# Patient Record
Sex: FEMALE | Race: BLACK OR AFRICAN AMERICAN | Employment: FULL TIME | URBAN - METROPOLITAN AREA
[De-identification: names, ages, dates, MRNs, and addresses within clinical notes are randomized per-mention and may not be internally consistent; named-entity substitution may affect disease eponyms.]

---

## 2017-09-01 ENCOUNTER — OFFICE VISIT (OUTPATIENT)
Dept: OBGYN CLINIC | Age: 37
End: 2017-09-01

## 2017-09-01 VITALS
WEIGHT: 166 LBS | BODY MASS INDEX: 27.66 KG/M2 | HEART RATE: 96 BPM | DIASTOLIC BLOOD PRESSURE: 98 MMHG | HEIGHT: 65 IN | SYSTOLIC BLOOD PRESSURE: 148 MMHG

## 2017-09-01 DIAGNOSIS — R03.0 ELEVATED BLOOD PRESSURE READING: ICD-10-CM

## 2017-09-01 DIAGNOSIS — Z01.419 WELL WOMAN EXAM WITH ROUTINE GYNECOLOGICAL EXAM: Primary | ICD-10-CM

## 2017-09-01 NOTE — PROGRESS NOTES
Subjective:   39 y.o. female for Well Woman Check. Patient's last menstrual period was 08/15/2017. Social History: single partner, contraception - none. Pertinent past medical hstory: see medical hx below. Patient Active Problem List   Diagnosis Code    Cardiomyopathy ([facility] [address] I42.9    Status post cardiac arrest I46.9    Hypokalemia E87.6    Hypomagnesemia E83.42    Automatic implantable cardiac defibrillator in situ Z95.810    AICD (automatic cardioverter/defibrillator) present Z95.810    AICD at end of battery life Z45.02     Current Outpatient Prescriptions   Medication Sig Dispense Refill    potassium chloride (K-DUR, KLOR-CON) 10 mEq tablet Take 1 Tab by mouth daily. 30 Tab 6    norgestimate-ethinyl estradiol (ORTHO TRI-CYCLEN, TRI-SPRINTEC) 0.18/0.215/0.25 mg-35 mcg (28) tab Take 1 Tab by mouth daily. 3 Package 4    magnesium 250 mg Tab Take 1 Tab by mouth daily. 30 Tab 6     No Known Allergies  Past Medical History:   Diagnosis Date    Cardiac arrest ([facility] [address] 01/01/2006 and 2009    with subsequent Medtronic single-chamber AICD    Cardiac cath 01/01/2006    Cardiac echocardiogram 09/08/2009    EF 60%. No valvular pathology.  Cardiomyopathy     Transient, with EF of 60% since September 8, 2009.  Hypokalemia     Hypomagnesemia     Torsades de pointes ([facility] [address]     recurrent     Past Surgical History:   Procedure Laterality Date    HX HEART CATHETERIZATION  01/01/2006    HX HERNIA REPAIR      HX PACEMAKER  09/2009    AICD placement     History reviewed. No pertinent family history. Social History   Substance Use Topics    Smoking status: Never Smoker    Smokeless tobacco: Never Used    Alcohol use Yes        ROS:  Feeling well. No dyspnea or chest pain on exertion. No abdominal pain, change in bowel habits, black or bloody stools. No urinary tract symptoms.  GYN ROS: normal menses, no abnormal bleeding, pelvic pain or discharge, no breast pain or new or enlarging lumps on self exam. No neurological complaints. Objective:     Visit Vitals    BP (!) 153/111 (BP 1 Location: Right arm, BP Patient Position: Sitting)    Pulse 96    Ht 5' 5\" (1.651 m)    Wt 166 lb (75.3 kg)    LMP 08/15/2017    BMI 27.62 kg/m2     The patient appears well, alert, oriented x 3, in no distress. ENT normal.  Neck supple. No adenopathy or thyromegaly. MARY. Lungs are clear, good air entry, no wheezes, rhonchi or rales. S1 and S2 normal, no murmurs, regular rate and rhythm. Abdomen soft without tenderness, guarding, mass or organomegaly. Extremities show no edema, normal peripheral pulses. Neurological is normal, no focal findings. BREAST EXAM: breasts appear normal, no suspicious masses, no skin or nipple changes or axillary nodes    PELVIC EXAM: VULVA: normal appearing vulva with no masses, tenderness or lesions, VAGINA: normal appearing vagina with normal color and discharge, no lesions, CERVIX: normal appearing cervix without discharge or lesions, cervical motion tenderness absent, UTERUS: uterus is normal size, shape, consistency and nontender, ADNEXA: normal adnexa in size, nontender and no masses    Assessment/Plan:   well woman  pap smear due 2018  counseled on breast self exam, STD prevention and HIV risk factors and prevention  return annually or prn    ICD-10-CM ICD-9-CM    1. Well woman exam with routine gynecological exam Z01.419 V72.31    2. Elevated blood pressure reading R03.0 796.2 REFERRAL TO PRIMARY CARE   .

## 2017-09-01 NOTE — MR AVS SNAPSHOT
Visit Information Date & Time Provider Department Dept. Phone Encounter #  
 9/1/2017  3:30 PM Rosalia Ocasio [place] [phone] [id number] Follow-up Instructions Return in about 1 year (around 9/1/2018). Upcoming Health Maintenance Date Due INFLUENZA AGE 9 TO ADULT 8/1/2017 PAP AKA CERVICAL CYTOLOGY 9/22/2019 Allergies as of 9/1/2017  Review Complete On: 9/1/2017 By: Jacquelyn Parr CNM No Known Allergies Current Immunizations  Never Reviewed No immunizations on file. Not reviewed this visit You Were Diagnosed With   
  
 Codes Comments Well woman exam with routine gynecological exam    -  Primary ICD-10-CM: R77.787 ICD-9-CM: V72.31 Elevated blood pressure reading     ICD-10-CM: R03.0 ICD-9-CM: 796.2 Vitals BP Pulse Height(growth percentile) Weight(growth percentile) LMP BMI  
 (!) 153/111 (BP 1 Location: Right arm, BP Patient Position: Sitting) 96 5' 5\" (1.651 m) 166 lb (75.3 kg) 08/15/2017 27.62 kg/m2 OB Status Smoking Status Having regular periods Never Smoker BMI and BSA Data Body Mass Index Body Surface Area  
 27.62 kg/m 2 1.86 m 2 Preferred Pharmacy Pharmacy Name Phone [facility] [phone] [place] [address] [phone] Your Updated Medication List  
  
   
This list is accurate as of: 9/1/17  3:58 PM.  Always use your most recent med list.  
  
  
  
  
 magnesium 250 mg Tab Take 1 Tab by mouth daily. norgestimate-ethinyl estradiol 0.18/0.215/0.25 mg-35 mcg (28) Tab Commonly known as:  ORTHO TRI-CYCLEN, TRI-SPRINTEC Take 1 Tab by mouth daily. potassium chloride 10 mEq tablet Commonly known as:  KLOR-CON Take 1 Tab by mouth daily. We Performed the Following REFERRAL TO PRIMARY CARE [NOG839 CPT(R)] Comments: Please evaluate patient for elevated blood pressure. Follow-up Instructions Return in about 1 year (around 9/1/2018). Referral Information Referral ID Referred By Referred To  
  
 [id number] Eugenia Islas NP   
   [address] [place] [address] [facility] Phone: [phone] Fax: [phone] Visits Status Start Date End Date 1 New Request 9/1/17 9/1/18 If your referral has a status of pending review or denied, additional information will be sent to support the outcome of this decision. Patient Instructions Well Visit, Ages 25 to 48: Care Instructions Your Care Instructions Physical exams can help you stay healthy. Your doctor has checked your overall health and may have suggested ways to take good care of yourself. He or she also may have recommended tests. At home, you can help prevent illness with healthy eating, regular exercise, and other steps. Follow-up care is a key part of your treatment and safety. Be sure to make and go to all appointments, and call your doctor if you are having problems. It's also a good idea to know your test results and keep a list of the medicines you take. How can you care for yourself at home? · Reach and stay at a healthy weight. This will lower your risk for many problems, such as obesity, diabetes, heart disease, and high blood pressure. · Get at least 30 minutes of physical activity on most days of the week. Walking is a good choice. You also may want to do other activities, such as running, swimming, cycling, or playing tennis or team sports. Discuss any changes in your exercise program with your doctor. · Do not smoke or allow others to smoke around you. If you need help quitting, talk to your doctor about stop-smoking programs and medicines. These can increase your chances of quitting for good.  
· Talk to your doctor about whether you have any risk factors for sexually transmitted infections (STIs). Having one sex partner (who does not have STIs and does not have sex with anyone else) is a good way to avoid these infections. · Use birth control if you do not want to have children at this time. Talk with your doctor about the choices available and what might be best for you. · Protect your skin from too much sun. When you're outdoors from 10 a.m. to 4 p.m., stay in the shade or cover up with clothing and a hat with a wide brim. Wear sunglasses that block UV rays. Even when it's cloudy, put broad-spectrum sunscreen (SPF 30 or higher) on any exposed skin. · See a dentist one or two times a year for checkups and to have your teeth cleaned. · Wear a seat belt in the car. · Drink alcohol in moderation, if at all. That means no more than 2 drinks a day for men and 1 drink a day for women. Follow your doctor's advice about when to have certain tests. These tests can spot problems early. For everyone · Cholesterol. Have the fat (cholesterol) in your blood tested after age 21. Your doctor will tell you how often to have this done based on your age, family history, or other things that can increase your risk for heart disease. · Blood pressure. Have your blood pressure checked during a routine doctor visit. Your doctor will tell you how often to check your blood pressure based on your age, your blood pressure results, and other factors. · Vision. Talk with your doctor about how often to have a glaucoma test. 
· Diabetes. Ask your doctor whether you should have tests for diabetes. · Colon cancer. Have a test for colon cancer at age 48. You may have one of several tests. If you are younger than 48, you may need a test earlier if you have any risk factors. Risk factors include whether you already had a precancerous polyp removed from your colon or whether your parent, brother, sister, or child has had colon cancer. For women · Breast exam and mammogram. Talk to your doctor about when you should have a clinical breast exam and a mammogram. Medical experts differ on whether and how often women under 50 should have these tests. Your doctor can help you decide what is right for you. · Pap test and pelvic exam. Begin Pap tests at age 24. A Pap test is the best way to find cervical cancer. The test often is part of a pelvic exam. Ask how often to have this test. 
· Tests for sexually transmitted infections (STIs). Ask whether you should have tests for STIs. You may be at risk if you have sex with more than one person, especially if your partners do not wear condoms. For men · Tests for sexually transmitted infections (STIs). Ask whether you should have tests for STIs. You may be at risk if you have sex with more than one person, especially if you do not wear a condom. · Testicular cancer exam. Ask your doctor whether you should check your testicles regularly. · Prostate exam. Talk to your doctor about whether you should have a blood test (called a PSA test) for prostate cancer. Experts differ on whether and when men should have this test. Some experts suggest it if you are older than 39 and are -American or have a father or brother who got prostate cancer when he was younger than 72. When should you call for help? Watch closely for changes in your health, and be sure to contact your doctor if you have any problems or symptoms that concern you. Where can you learn more? Go to http://papito-sierra.info/. Enter P072 in the search box to learn more about \"Well Visit, Ages 25 to 48: Care Instructions. \" Current as of: July 19, 2016 Content Version: 11.3 © 0781-5898 Healthwise, Incorporated. Care instructions adapted under license by [employer] (which disclaims liability or warranty for this information).  If you have questions about a medical condition or this instruction, always ask your healthcare professional. [address] any warranty or liability for your use of this information. Introducing [facility] & HEALTH SERVICES! Dear Candelaria Infante: Thank you for requesting a [employer]Realm account. Our records indicate that you already have an active GymRealm account. You can access your account anytime at [email or web address] Did you know that you can access your hospital and ER discharge instructions at any time in GymRealm? You can also review all of your test results from your hospital stay or ER visit. Additional Information If you have questions, please visit the Frequently Asked Questions section of the GymRealm website at [email or web address] Remember, GymRealm is NOT to be used for urgent needs. For medical emergencies, dial 911. Now available from your iPhone and Android! Please provide this summary of care documentation to your next provider. Your primary care clinician is listed as Stephanie Vinson. If you have any questions after today's visit, please call [phone].

## 2017-10-19 ENCOUNTER — TELEPHONE (OUTPATIENT)
Dept: OBGYN CLINIC | Age: 37
End: 2017-10-19

## 2017-10-19 NOTE — TELEPHONE ENCOUNTER
Patient was not prescribed birth control at last office visit due to extremely high blood pressure values. She will need to schedule an office visit to have blood pressure re-checked and review cardiac hx for birth control safety.

## 2017-10-20 NOTE — TELEPHONE ENCOUNTER
Left message for pt that she was not prescribed BC due to her blood pressure being elevated. Pt needs to schedule an OV to recheck her BP and review her cardiac history before [facility] and be prescribed safely.

## 2018-02-08 ENCOUNTER — CLINICAL SUPPORT (OUTPATIENT)
Dept: CARDIOLOGY CLINIC | Age: 38
End: 2018-02-08

## 2018-02-08 ENCOUNTER — OFFICE VISIT (OUTPATIENT)
Dept: CARDIOLOGY CLINIC | Age: 38
End: 2018-02-08

## 2018-02-08 VITALS
WEIGHT: 172 LBS | BODY MASS INDEX: 28.66 KG/M2 | HEART RATE: 71 BPM | DIASTOLIC BLOOD PRESSURE: 60 MMHG | SYSTOLIC BLOOD PRESSURE: 140 MMHG | OXYGEN SATURATION: 99 % | HEIGHT: 65 IN

## 2018-02-08 DIAGNOSIS — Z45.02 AICD AT END OF BATTERY LIFE: ICD-10-CM

## 2018-02-08 DIAGNOSIS — I42.9 CARDIOMYOPATHY, UNSPECIFIED TYPE (HCC): ICD-10-CM

## 2018-02-08 DIAGNOSIS — I42.9 CARDIOMYOPATHY, UNSPECIFIED TYPE (HCC): Primary | ICD-10-CM

## 2018-02-08 DIAGNOSIS — Z95.810 AICD (AUTOMATIC CARDIOVERTER/DEFIBRILLATOR) PRESENT: Primary | ICD-10-CM

## 2018-02-08 DIAGNOSIS — I49.01 VF (VENTRICULAR FIBRILLATION) (HCC): ICD-10-CM

## 2018-02-08 DIAGNOSIS — I46.9 CARDIAC ARREST (HCC): ICD-10-CM

## 2018-02-08 DIAGNOSIS — Z95.810 AICD (AUTOMATIC CARDIOVERTER/DEFIBRILLATOR) PRESENT: ICD-10-CM

## 2018-02-08 RX ORDER — POTASSIUM CHLORIDE 750 MG/1
10 TABLET, EXTENDED RELEASE ORAL DAILY
Qty: 30 TAB | Refills: 6 | Status: SHIPPED | OUTPATIENT
Start: 2018-02-08 | End: 2018-11-20 | Stop reason: SDUPTHER

## 2018-02-08 NOTE — MR AVS SNAPSHOT
[address] [address] [address] [address] [phone] Patient: Tavia Irene MRN: [id number] SHIRLEY:[id number] Visit Information Date & Time Provider Department Dept. Phone Encounter #  
 2/8/2018  3:40 PM Ilsa Keller MD [facility] [address] [id number] Your Appointments 5/16/2018  2:40 PM  
CARELINK with Pacer Hv [facility] [address] ([facility]) Appt Note: 3 month carelink [place] [address] [address] [address] [phone] [address] [place] [address] Upcoming Health Maintenance Date Due Pneumococcal 19-64 Medium Risk (1 of 1 - PPSV23) 10/15/1999 DTaP/Tdap/Td series (1 - Tdap) 10/15/2001 Influenza Age 5 to Adult 8/1/2017 PAP AKA CERVICAL CYTOLOGY 9/22/2019 Allergies as of 2/8/2018  Review Complete On: 9/1/2017 By: Gareth Ambriz CNM No Known Allergies Current Immunizations  Never Reviewed No immunizations on file. Not reviewed this visit You Were Diagnosed With   
  
 Codes Comments AICD (automatic cardioverter/defibrillator) present    -  Primary ICD-10-CM: Z95.810 ICD-9-CM: V45.02 Cardiac arrest [facility])     ICD-10-CM: I46.9 ICD-9-CM: 427.5 AICD at end of battery life     ICD-10-CM: Z45.02 
ICD-9-CM: V53.32 Vitals BP Pulse Height(growth percentile) Weight(growth percentile) SpO2 BMI  
 140/60 71 5' 5\" (1.651 m) 172 lb (78 kg) 99% 28.62 kg/m2 OB Status Smoking Status Having regular periods Never Smoker Vitals History BMI and BSA Data Body Mass Index Body Surface Area  
 28.62 kg/m 2 1.89 m 2 Preferred Pharmacy Pharmacy Name Phone [facility] [phone], [address] [place] [address] [phone] Your Updated Medication List  
  
   
This list is accurate as of: 2/8/18  4:06 PM.  Always use your most recent med list.  
  
  
  
  
 magnesium 250 mg Tab Take 1 Tab by mouth daily. norgestimate-ethinyl estradiol 0.18/0.215/0.25 mg-35 mcg (28) Tab Commonly known as:  ORTHO TRI-CYCLEN, TRI-SPRINTEC Take 1 Tab by mouth daily. potassium chloride 10 mEq tablet Commonly known as:  KLOR-CON Take 1 Tab by mouth daily. We Performed the Following AMB POC EKG ROUTINE W/ 12 LEADS, INTER & REP [94568 CPT(R)] Introducing [facility] & [facility]! Dear Lei Rivera: Thank you for requesting a Ringpay account. Our records indicate that you already have an active Ringpay account. You can access your account anytime at [email or web address] Did you know that you can access your hospital and ER discharge instructions at any time in Ringpay? You can also review all of your test results from your hospital stay or ER visit. Additional Information If you have questions, please visit the Frequently Asked Questions section of the Ringpay website at [email or web address] Remember, Ringpay is NOT to be used for urgent needs. For medical emergencies, dial 911. Now available from your iPhone and Android! Please provide this summary of care documentation to your next provider. Your primary care clinician is listed as Rafael Huber. If you have any questions after today's visit, please call [phone].

## 2018-02-08 NOTE — PROGRESS NOTES
History of Present Illness:  A 40 y.o. female here for follow up. She has a history of ventricular fibrillation and AICD shock. Overall, she is doing well. She denies any new chest pain, dyspnea, presyncope, syncope, PND, orthopnea or edema. Impression/Plan:   History of ventricular fibrillation now resolved. History of transient cardiomyopathy with EF 60% 2009, previously 35%. History of cardiac arrest 2009 as well as 2006. Medtronic single chamber AICD with normal function, changeout 2016. History of hypokalemia and hypomagnesemia on supplements. Clinically, doing well. She has not had any events on her AICD and is functioning normally. Her blood pressure is reasonable, but a bit high. We discussed weight loss and exercise. She is working at [employer] and her stress level has improved. I will see her back in one year with quarterly device checks of her AICD. Past Medical History:   Diagnosis Date    Cardiac arrest ([facility] [address] 01/01/2006 and 2009    with subsequent Medtronic single-chamber AICD    Cardiac cath 01/01/2006    Cardiac echocardiogram 09/08/2009    EF 60%. No valvular pathology.  Cardiomyopathy     Transient, with EF of 60% since September 8, 2009.  Hypokalemia     Hypomagnesemia     Torsades de pointes ([facility])     recurrent       Current Outpatient Prescriptions   Medication Sig Dispense Refill    potassium chloride (K-DUR, KLOR-CON) 10 mEq tablet Take 1 Tab by mouth daily. 30 Tab 6    norgestimate-ethinyl estradiol (ORTHO TRI-CYCLEN, TRI-SPRINTEC) 0.18/0.215/0.25 mg-35 mcg (28) tab Take 1 Tab by mouth daily. 3 Package 4    magnesium 250 mg Tab Take 1 Tab by mouth daily. 30 Tab 6       Social History   reports that she has never smoked. She has never used smokeless tobacco.   reports that she drinks alcohol. Family History  family history is not on file. Review of Systems  Except as stated above include:  Constitutional: Negative for fever, chills and malaise/fatigue.    HEENT: No congestion or recent URI. Gastrointestinal: No nausea, vomiting, abdominal pain, bloody stools. Pulmonary:  Negative except as stated above. Cardiac:  Negative except as stated above. Musculoskeletal: Negative except as stated above. Neurological:  No localized symptoms. Skin:  Negative except as stated above. Psych:  No mood swings. Endocrine:  No heat/cold intolerance. PHYSICAL EXAM  BP Readings from Last 3 Encounters:   02/08/18 140/60   09/01/17 (!) 148/98   11/29/16 (!) 142/97     Pulse Readings from Last 3 Encounters:   02/08/18 71   09/01/17 96   11/29/16 68     Wt Readings from Last 3 Encounters:   02/08/18 78 kg (172 lb)   09/01/17 75.3 kg (166 lb)   11/29/16 74.8 kg (165 lb)     General:   Well developed, well groomed. Head/Neck:   No jugular venous distention     No carotid bruits. No evidence of xanthelasma. Lungs:   No respiratory distress. Clear bilaterally. Heart:    Regular rate and rhythm. Normal S1/S2. Palpation of heart with normal point of maximum impulse. No significant murmurs, rubs or gallops. AICD intact. Abdomen:   Soft and nontender. No palpable abdominal mass or bruits. Extremities:   Intact peripheral pulses. No significant edema. Neurological:   Alert and oriented to person, place, time. No focal neurological deficit visually. Blood Pressure Metric:  Brandie Baig has been given the following recommendations today due to her elevated BP reading: lifestyle modifications to include: weight reduction.

## 2018-02-08 NOTE — PROGRESS NOTES
1. Have you been to the ER, urgent care clinic since your last visit? Hospitalized since your last visit? No     2. Have you seen or consulted any other health care providers outside of the [address] [place] [address] since your last visit? Include any pap smears or colon screening.  No

## 2018-02-09 NOTE — PROGRESS NOTES
I have personally seen and evaluated the device findings. Interrogation reviewed and I agree with assessment.     Hay Mayer

## 2018-05-16 ENCOUNTER — OFFICE VISIT (OUTPATIENT)
Dept: CARDIOLOGY CLINIC | Age: 38
End: 2018-05-16

## 2018-05-16 DIAGNOSIS — Z95.810 AICD (AUTOMATIC CARDIOVERTER/DEFIBRILLATOR) PRESENT: ICD-10-CM

## 2018-05-16 DIAGNOSIS — I42.9 CARDIOMYOPATHY, UNSPECIFIED TYPE (HCC): Primary | ICD-10-CM

## 2018-05-31 NOTE — PROGRESS NOTES
I have personally seen and evaluated the device findings. Interrogation reviewed and I agree with assessment.     Amanda Lew

## 2018-08-13 ENCOUNTER — TELEPHONE (OUTPATIENT)
Dept: CARDIOLOGY CLINIC | Age: 38
End: 2018-08-13

## 2018-08-13 NOTE — TELEPHONE ENCOUNTER
Patient called today states over the weekend her device kept beeping she spoke to the on call doctor Saturday, she just wanted to let Dr. Tom Meza know it was the magnetic button she was wearing causing her device to beep. I advised patient not to wear magnetic button I will let Dr. Tom Meza know and call her back with any changes. I made Divya Comer RN aware. .per orders Melida have patient send carelink. I called patient back. .. Had to [facility] to call office back to let her know to send carelink.   Miah Howell

## 2018-11-20 RX ORDER — POTASSIUM CHLORIDE 750 MG/1
TABLET, EXTENDED RELEASE ORAL
Qty: 30 TAB | Refills: 0 | Status: SHIPPED | OUTPATIENT
Start: 2018-11-20 | End: 2018-12-21 | Stop reason: SDUPTHER

## 2018-12-24 RX ORDER — POTASSIUM CHLORIDE 750 MG/1
TABLET, EXTENDED RELEASE ORAL
Qty: 30 TAB | Refills: 0 | Status: SHIPPED | OUTPATIENT
Start: 2018-12-24 | End: 2019-01-22 | Stop reason: SDUPTHER

## 2019-01-22 RX ORDER — POTASSIUM CHLORIDE 750 MG/1
TABLET, EXTENDED RELEASE ORAL
Qty: 30 TAB | Refills: 0 | Status: SHIPPED | OUTPATIENT
Start: 2019-01-22 | End: 2019-02-22 | Stop reason: SDUPTHER

## 2019-02-22 RX ORDER — POTASSIUM CHLORIDE 750 MG/1
TABLET, EXTENDED RELEASE ORAL
Qty: 30 TAB | Refills: 0 | Status: SHIPPED | OUTPATIENT
Start: 2019-02-22 | End: 2019-04-04 | Stop reason: SDUPTHER

## 2019-04-04 ENCOUNTER — OFFICE VISIT (OUTPATIENT)
Dept: CARDIOLOGY CLINIC | Age: 39
End: 2019-04-04

## 2019-04-04 VITALS
WEIGHT: 174 LBS | OXYGEN SATURATION: 98 % | HEART RATE: 75 BPM | DIASTOLIC BLOOD PRESSURE: 84 MMHG | SYSTOLIC BLOOD PRESSURE: 128 MMHG | BODY MASS INDEX: 28.96 KG/M2

## 2019-04-04 DIAGNOSIS — I49.01 VF (VENTRICULAR FIBRILLATION) (HCC): ICD-10-CM

## 2019-04-04 DIAGNOSIS — I42.9 CARDIOMYOPATHY, UNSPECIFIED TYPE (HCC): ICD-10-CM

## 2019-04-04 DIAGNOSIS — Z95.810 AICD (AUTOMATIC CARDIOVERTER/DEFIBRILLATOR) PRESENT: Primary | ICD-10-CM

## 2019-04-04 RX ORDER — POTASSIUM CHLORIDE 750 MG/1
TABLET, EXTENDED RELEASE ORAL
Qty: 30 TAB | Refills: 6 | Status: SHIPPED | OUTPATIENT
Start: 2019-04-04 | End: 2020-04-07 | Stop reason: SDUPTHER

## 2019-04-04 NOTE — PROGRESS NOTES
History of Present Illness:  A 45 y.o. female here for follow up. Overall, doing well for the past year. She continues to work at the [employer] with minimal stress levels. She has a remote AICD placement. She is taking potassium, no other medications. No new chest pain, dyspnea, PND, orthopnea or edema. Impression/Plan:  
History of idiopathic ventricular fibrillation, resolved. History of transient cardiomyopathy with previous EF 35% and improving to normal. 
Cardiac arrest 2009 as well as 2006. Medtronic single chamber AICD with normal function, change-out 2016. History of hypokalemia and hypomagnesemia on supplements. Clinically she is doing well. No events on AICD. She is trying to exercise. She continues to work at the [employer]. All questions answered. I will continue with quarterly home checks and see her back in the office in a year. I see no indication to repeat an echocardiogram at this time since she has had no new symptoms and doing very well. Past Medical History:  
Diagnosis Date  Cardiac arrest ([facility] [address] 01/01/2006 and 2009  
 with subsequent Medtronic single-chamber AICD  Cardiac cath 01/01/2006  Cardiac echocardiogram 09/08/2009 EF 60%. No valvular pathology.  Cardiomyopathy Transient, with EF of 60% since September 8, 2009.  Hypokalemia  Hypomagnesemia  Torsades de pointes ([facility] recurrent Current Outpatient Medications Medication Sig Dispense Refill  potassium chloride (KLOR-CON) 10 mEq tablet TAKE 1 TABLET BY MOUTH DAILY 30 Tab 0  
 norgestimate-ethinyl estradiol (ORTHO TRI-CYCLEN, TRI-SPRINTEC) 0.18/0.215/0.25 mg-35 mcg (28) tab Take 1 Tab by mouth daily. 3 Package 4  
 magnesium 250 mg Tab Take 1 Tab by mouth daily. 30 Tab 6 Social History 
 reports that she has never smoked. She has never used smokeless tobacco. 
 reports that she drinks alcohol. Family History 
family history is not on file. Review of Systems Except as stated above include: 
Constitutional: Negative for fever, chills and malaise/fatigue. HEENT: No congestion or recent URI. Gastrointestinal: No nausea, vomiting, abdominal pain, bloody stools. Pulmonary:  Negative except as stated above. Cardiac:  Negative except as stated above. Musculoskeletal: Negative except as stated above. Neurological:  No localized symptoms. Skin:  Negative except as stated above. Psych:  Negative except as stated above. Endocrine:  Negative except as stated above. PHYSICAL EXAM 
BP Readings from Last 3 Encounters:  
04/04/19 128/84  
02/08/18 140/60  
09/01/17 (!) 148/98 Pulse Readings from Last 3 Encounters:  
04/04/19 75  
02/08/18 71  
09/01/17 96 Wt Readings from Last 3 Encounters:  
04/04/19 78.9 kg (174 lb) 02/08/18 78 kg (172 lb) 09/01/17 75.3 kg (166 lb) General:   Well developed, well groomed. Head/Neck:   No jugular venous distention No carotid bruits. No evidence of xanthelasma. Lungs:   No respiratory distress. Clear bilaterally. Heart:    Regular rate and rhythm. Normal S1/S2. Palpation of heart with normal point of maximum impulse. No significant murmurs, rubs or gallops. AICD intact. Abdomen:   Soft and nontender. No palpable abdominal mass or bruits. Extremities:   Intact peripheral pulses. No significant edema. Neurological:   Alert and oriented to person, place, time. No focal neurological deficit visually. Skin:   No obvious rash Blood Pressure Metric: 
Marzena has been given the following recommendations today due to her elevated BP reading: controlled

## 2019-10-25 NOTE — PATIENT INSTRUCTIONS

## 2020-04-07 RX ORDER — POTASSIUM CHLORIDE 750 MG/1
TABLET, EXTENDED RELEASE ORAL
Qty: 90 TAB | Refills: 3 | Status: SHIPPED | OUTPATIENT
Start: 2020-04-07 | End: 2020-04-15

## 2020-04-15 RX ORDER — POTASSIUM CHLORIDE 750 MG/1
10 TABLET, EXTENDED RELEASE ORAL DAILY
Qty: 30 TAB | Refills: 3 | Status: SHIPPED | OUTPATIENT
Start: 2020-04-15 | End: 2021-03-15

## 2020-05-28 ENCOUNTER — OFFICE VISIT (OUTPATIENT)
Dept: CARDIOLOGY CLINIC | Age: 40
End: 2020-05-28

## 2020-05-28 VITALS
BODY MASS INDEX: 27.32 KG/M2 | HEIGHT: 65 IN | HEART RATE: 91 BPM | DIASTOLIC BLOOD PRESSURE: 78 MMHG | WEIGHT: 164 LBS | OXYGEN SATURATION: 99 % | SYSTOLIC BLOOD PRESSURE: 120 MMHG

## 2020-05-28 DIAGNOSIS — I49.01 VF (VENTRICULAR FIBRILLATION) (HCC): ICD-10-CM

## 2020-05-28 DIAGNOSIS — I42.9 CARDIOMYOPATHY, UNSPECIFIED TYPE (HCC): ICD-10-CM

## 2020-05-28 DIAGNOSIS — Z95.810 AICD (AUTOMATIC CARDIOVERTER/DEFIBRILLATOR) PRESENT: Primary | ICD-10-CM

## 2020-05-28 NOTE — PROGRESS NOTES
HPI: A 36-year old female here for follow up. Overall, she is doing well. She does work at the [email or web address] and recently she has had to be outside with personal protective gear in [address] [place] [address] for prolonged periods of time. She denies any new chest pain, dyspnea, orthopnea, PND, or edema. She has gradually lost about ten pounds through diet and some exercise. Impression/Plan:  1. History of idiopathic ventricular fibrillation, resolved. 2. History of transient cardiomyopathy with previous ejection fraction of 35% improving to normal.  3. Cardiac arrest 2009 as well as 2006. 4. Medtronic single chamber AICD with normal function, changeout 2016.  5. History of hypokalemia and hypomagnesemia on supplements. She is requesting a work note to minimize exposure to prolonged heat. Given her previous ventricular arrhythmias and AICD, I recommended she try to avoid this if at all possible. Her AICD is functioning normally, no significant events. She continues to gradually lose weight. She asked about the keto diet which I asked her to avoid given her previous ventricular fibrillation and electrolyte abnormalities. I will see her back in one year. Past Medical History:   Diagnosis Date    Cardiac arrest ([facility] [address] 01/01/2006 and 2009    with subsequent Medtronic single-chamber AICD    Cardiac cath 01/01/2006    Cardiac echocardiogram 09/08/2009    EF 60%. No valvular pathology.  Cardiomyopathy     Transient, with EF of 60% since September 8, 2009.  Hypokalemia     Hypomagnesemia     Torsades de pointes (HCC)     recurrent       Current Outpatient Medications   Medication Sig Dispense Refill    potassium chloride (Klor-Con M10) 10 mEq tablet Take 1 Tab by mouth daily. 30 Tab 3    norgestimate-ethinyl estradiol (ORTHO TRI-CYCLEN, TRI-SPRINTEC) 0.18/0.215/0.25 mg-35 mcg (28) tab Take 1 Tab by mouth daily. 3 Package 4    magnesium 250 mg Tab Take 1 Tab by mouth daily.  30 Tab 6       Social History   reports that she has never smoked. She has never used smokeless tobacco.   reports current alcohol use. Family History  family history is not on file. Review of Systems  Except as stated above include:  Constitutional: Negative for fever, chills and malaise/fatigue. HEENT: No congestion or recent URI. Gastrointestinal: No nausea, vomiting, abdominal pain, bloody stools. Pulmonary:  Negative except as stated above. Cardiac:  Negative except as stated above. Musculoskeletal: Negative except as stated above. Neurological:  No localized symptoms. Skin:  Negative except as stated above. Psych:  Negative except as stated above. Endocrine:  Negative except as stated above. PHYSICAL EXAM  BP Readings from Last 3 Encounters:   05/28/20 120/78   04/04/19 128/84   02/08/18 140/60     Pulse Readings from Last 3 Encounters:   05/28/20 91   04/04/19 75   02/08/18 71     Wt Readings from Last 3 Encounters:   05/28/20 74.4 kg (164 lb)   04/04/19 78.9 kg (174 lb)   02/08/18 78 kg (172 lb)     General:   Well developed, well groomed. Head/Neck:   No jugular venous distention     No carotid bruits. No evidence of xanthelasma. Lungs:   No respiratory distress. Clear bilaterally. Heart:    Regular rate and rhythm. Normal S1/S2. Palpation of heart with normal point of maximum impulse. No significant murmurs, rubs or gallops. AICD pocket intact. Abdomen:   Soft and nontender. No palpable abdominal mass or bruits. Extremities:   Intact peripheral pulses. No significant edema. Neurological:   Alert and oriented to person, place, time. No focal neurological deficit visually. Skin:   No obvious rash    Blood Pressure Metric:  Monitor recommended and adjustments stated if needed.

## 2020-05-28 NOTE — LETTER
NOTIFICATION FOR WORK  
 
5/28/2020 11:35 AM 
 
Ms. Lauren Vance [address] To Whom It May Concern: 
 
Lauren Vance was under the care of [address] [place] [address]. She should avoid prolonged exposure to heat, especially outdoors due to her cardiac medical history. If there are questions or concerns please have the patient contact our office. Sincerely, Saad Calderon MD

## 2020-05-28 NOTE — PROGRESS NOTES
Lauren Vance presents today for   Chief Complaint   Patient presents with    Follow-up     1 year follow up       Lauren Vance preferred language for health care discussion is english/other. Is someone accompanying this pt? no    Is the patient using any DME equipment during [address]? No      Depression Screening:  3 most recent PHQ Screens 5/28/2020   Little interest or pleasure in doing things Not at all   Feeling down, depressed, irritable, or hopeless Not at all   Total Score PHQ 2 0       Learning Assessment:  Learning Assessment 2/8/2018   PRIMARY LEARNER Patient   HIGHEST LEVEL OF EDUCATION - PRIMARY LEARNER  -   BARRIERS PRIMARY LEARNER -   PRIMARY LANGUAGE ENGLISH   LEARNER PREFERENCE PRIMARY DEMONSTRATION   ANSWERED BY Patient   RELATIONSHIP SELF       Abuse Screening:  Abuse Screening Questionnaire 5/28/2020   Do you ever feel afraid of your partner? N   Are you in a relationship with someone who physically or mentally threatens you? N   Is it safe for you to go home? Y       Fall Risk  Fall Risk Assessment, last 12 mths 5/28/2020   Able to walk? Yes   Fall in past 12 months? No       Pt currently taking Anticoagulant therapy? no    Coordination of Care:  1. Have you been to the ER, urgent care clinic since your last visit? Hospitalized since your last visit? no    2. Have you seen or consulted any other health care providers outside of the [address] [place] [address] since your last visit? Include any pap smears or colon screening.  no

## 2020-08-07 ENCOUNTER — TELEPHONE (OUTPATIENT)
Dept: CARDIOLOGY CLINIC | Age: 40
End: 2020-08-07

## 2020-08-07 NOTE — TELEPHONE ENCOUNTER
Prior authorization request for Potassium Chloride ER 10 meq submitted via [employer]      (Key: [id number])       Authorization pending.   The plan will fax you a determination, typically within 1 to 5 business days

## 2021-03-15 RX ORDER — POTASSIUM CHLORIDE 750 MG/1
TABLET, EXTENDED RELEASE ORAL
Qty: 90 TAB | Refills: 0 | Status: SHIPPED | OUTPATIENT
Start: 2021-03-15 | End: 2021-06-17

## 2021-06-17 RX ORDER — POTASSIUM CHLORIDE 750 MG/1
TABLET, EXTENDED RELEASE ORAL
Qty: 90 TABLET | Refills: 0 | Status: SHIPPED | OUTPATIENT
Start: 2021-06-17 | End: 2021-09-09 | Stop reason: SDUPTHER

## 2021-09-09 ENCOUNTER — OFFICE VISIT (OUTPATIENT)
Dept: CARDIOLOGY CLINIC | Age: 41
End: 2021-09-09
Payer: COMMERCIAL

## 2021-09-09 VITALS — BODY MASS INDEX: 27.29 KG/M2 | HEIGHT: 65 IN

## 2021-09-09 DIAGNOSIS — I49.01 VF (VENTRICULAR FIBRILLATION) (HCC): ICD-10-CM

## 2021-09-09 DIAGNOSIS — I42.9 CARDIOMYOPATHY, UNSPECIFIED TYPE (HCC): ICD-10-CM

## 2021-09-09 DIAGNOSIS — Z95.810 AICD (AUTOMATIC CARDIOVERTER/DEFIBRILLATOR) PRESENT: Primary | ICD-10-CM

## 2021-09-09 PROCEDURE — 99214 OFFICE O/P EST MOD 30 MIN: CPT | Performed by: INTERNAL MEDICINE

## 2021-09-09 RX ORDER — POTASSIUM CHLORIDE 750 MG/1
10 TABLET, EXTENDED RELEASE ORAL DAILY
Qty: 90 TABLET | Refills: 3 | Status: SHIPPED | OUTPATIENT
Start: 2021-09-09 | End: 2022-09-08 | Stop reason: SDUPTHER

## 2021-09-09 NOTE — PROGRESS NOTES
Michaelle Todd presents today for   Chief Complaint   Patient presents with    Follow-up     1 year follow up        Michaelle Todd preferred language for health care discussion is english/other. Is someone accompanying this pt? no    Is the patient using any DME equipment during [address]? no    Depression Screening:  3 most recent PHQ Screens 9/9/2021   Little interest or pleasure in doing things Not at all   Feeling down, depressed, irritable, or hopeless Not at all   Total Score PHQ 2 0       Learning Assessment:  Learning Assessment 2/8/2018   PRIMARY LEARNER Patient   HIGHEST LEVEL OF EDUCATION - PRIMARY LEARNER  -   BARRIERS PRIMARY LEARNER -   PRIMARY LANGUAGE ENGLISH   LEARNER PREFERENCE PRIMARY DEMONSTRATION   ANSWERED BY Patient   RELATIONSHIP SELF       Abuse Screening:  Abuse Screening Questionnaire 5/28/2020   Do you ever feel afraid of your partner? N   Are you in a relationship with someone who physically or mentally threatens you? N   Is it safe for you to go home? Y       Fall Risk  Fall Risk Assessment, last 12 mths 5/28/2020   Able to walk? Yes   Fall in past 12 months? No       Pt currently taking Anticoagulant therapy? no    Coordination of Care:  1. Have you been to the ER, urgent care clinic since your last visit? Hospitalized since your last visit? no    2. Have you seen or consulted any other health care providers outside of the [address] [place] [address] since your last visit? Include any pap smears or colon screening.  No

## 2021-09-09 NOTE — PROGRESS NOTES
History of Present Illness:  36year old female here for follow up. Overall she is doing well. She continues to be quite active. No new chest pain, dyspnea, PND, orthopnea or edema. She does need a refill on potassium. Impression:  1. Hx of idiopathic ventricular fibrillation, resolved. 2. Hx of transient nonischemic cardiomyopathy with EF down to 30s in 2009, improving. 3. Cardiac arrest 2009, as well as 2006. 4. Medtronic single chamber AICD with normal function, change-out 2016.  5. Hx of hypokalemia and hypomagnesemia, on supplements. Refilling today. Plan:  Her AICD is functioning normally. She has had some events where it appears to be more of a sinus tachycardia as the morphology is baseline sinus. She has not had any ventricular fibrillation events and remains on electrolyte supplement. I will plan device check in six months and see her back in the office in a year. Past Medical History:   Diagnosis Date    Cardiac arrest ([facility] [address] 01/01/2006 and 2009    with subsequent Medtronic single-chamber AICD    Cardiac cath 01/01/2006    Cardiac echocardiogram 09/08/2009    EF 60%. No valvular pathology.  Cardiomyopathy     Transient, with EF of 60% since September 8, 2009.  Hypokalemia     Hypomagnesemia     Torsades de pointes (HCC)     recurrent       Current Outpatient Medications   Medication Sig Dispense Refill    potassium chloride (Klor-Con M10) 10 mEq tablet Take 1 Tablet by mouth daily. 90 Tablet 3    norgestimate-ethinyl estradiol (ORTHO TRI-CYCLEN, TRI-SPRINTEC) 0.18/0.215/0.25 mg-35 mcg (28) tab Take 1 Tab by mouth daily. 3 Package 4    magnesium 250 mg Tab Take 1 Tab by mouth daily. 30 Tab 6       Social History   reports that she has never smoked. She has never used smokeless tobacco.   reports current alcohol use. Family History  family history is not on file.     Review of Systems  Except as stated above include:  Constitutional: Negative for fever, chills and malaise/fatigue. HEENT: No congestion or recent URI. Gastrointestinal: No nausea, vomiting, abdominal pain, bloody stools. Pulmonary:  Negative except as stated above. Cardiac:  Negative except as stated above. Musculoskeletal: Negative except as stated above. Neurological:  No localized symptoms. Skin:  Negative except as stated above. Psych:  Negative except as stated above. Endocrine:  Negative except as stated above. PHYSICAL EXAM  BP Readings from Last 3 Encounters:   05/28/20 120/78   04/04/19 128/84   02/08/18 140/60     Pulse Readings from Last 3 Encounters:   05/28/20 91   04/04/19 75   02/08/18 71     Wt Readings from Last 3 Encounters:   05/28/20 74.4 kg (164 lb)   04/04/19 78.9 kg (174 lb)   02/08/18 78 kg (172 lb)     General:   Well developed, well groomed. Head/Neck:   No obvious jugular venous distention     No obvious carotid pulsations. No evidence of xanthelasma. Lungs:   No respiratory distress. Clear bilaterally. Heart:  Regular rate and rhythm. Normal S1/S2. Palpation grossly normal.    No significant murmurs, rubs or gallops. AICD pocket intact. Abdomen:   Non-acute abdomen. No obvious pulsations. Extremities:   Intact peripheral pulses. No significant edema. Neurological:   Alert and oriented to person, place, time. No focal neurological deficit visually. Skin:   No obvious rash    Blood Pressure Metric:  Monitor recommended and adjustments stated if needed.

## 2021-09-09 NOTE — LETTER
NOTIFICATION OF RETURN TO WORK / SCHOOL    9/9/2021 9:08 AM    Ms. Sammye Kehr  [address] [address]  . To Whom It May Concern:    Sammye Kehr was under the care of [address] [place] [address] on September 9, 2021. She will be able to return to work/school on 9/10/21 with no restrictions. If there are questions or concerns please have the patient contact our office.     Sincerely,                  Genia Villatoro MD

## 2021-09-23 NOTE — PROGRESS NOTES
I have personally seen and evaluated the device findings. Interrogation reviewed and I agree with assessment.     Castillo Prajapati

## 2021-12-07 ENCOUNTER — TRANSCRIBE ORDER (OUTPATIENT)
Dept: SCHEDULING | Age: 41
End: 2021-12-07

## 2021-12-07 DIAGNOSIS — Z12.31 VISIT FOR SCREENING MAMMOGRAM: Primary | ICD-10-CM

## 2022-02-21 ENCOUNTER — HOSPITAL ENCOUNTER (OUTPATIENT)
Dept: MAMMOGRAPHY | Age: 42
Discharge: HOME | End: 2022-02-21
Attending: OBSTETRICS & GYNECOLOGY
Payer: COMMERCIAL

## 2022-02-21 DIAGNOSIS — Z12.31 VISIT FOR SCREENING MAMMOGRAM: ICD-10-CM

## 2022-02-21 PROCEDURE — 77063 BREAST TOMOSYNTHESIS BI: CPT

## 2022-09-08 ENCOUNTER — OFFICE VISIT (OUTPATIENT)
Dept: CARDIOLOGY CLINIC | Age: 42
End: 2022-09-08
Payer: COMMERCIAL

## 2022-09-08 ENCOUNTER — CLINICAL SUPPORT (OUTPATIENT)
Dept: CARDIOLOGY CLINIC | Age: 42
End: 2022-09-08

## 2022-09-08 VITALS
BODY MASS INDEX: 25.49 KG/M2 | OXYGEN SATURATION: 100 % | WEIGHT: 153 LBS | HEART RATE: 71 BPM | DIASTOLIC BLOOD PRESSURE: 80 MMHG | HEIGHT: 65 IN | SYSTOLIC BLOOD PRESSURE: 128 MMHG

## 2022-09-08 DIAGNOSIS — I42.9 CARDIOMYOPATHY, UNSPECIFIED TYPE (HCC): ICD-10-CM

## 2022-09-08 DIAGNOSIS — Z95.810 AICD (AUTOMATIC CARDIOVERTER/DEFIBRILLATOR) PRESENT: Primary | ICD-10-CM

## 2022-09-08 DIAGNOSIS — I49.01 VF (VENTRICULAR FIBRILLATION) (HCC): ICD-10-CM

## 2022-09-08 PROCEDURE — 93289 INTERROG DEVICE EVAL HEART: CPT | Performed by: INTERNAL MEDICINE

## 2022-09-08 PROCEDURE — 99214 OFFICE O/P EST MOD 30 MIN: CPT | Performed by: INTERNAL MEDICINE

## 2022-09-08 RX ORDER — POTASSIUM CHLORIDE 750 MG/1
10 TABLET, EXTENDED RELEASE ORAL DAILY
Qty: 90 TABLET | Refills: 3 | Status: SHIPPED | OUTPATIENT
Start: 2022-09-08 | End: 2022-10-26

## 2022-09-08 NOTE — LETTER
NOTIFICATION OF RETURN TO WORK / SCHOOL    9/8/2022 9:08 AM    Ms. Wyatt Aguilar  [address] [address]  . To Whom It May Concern:    Wyatt Aguilar was under the care of [address] [place] [address] from February 2011 to present. She will be unable to return to work until Saturday September 10, 2022. If there are questions or concerns please have the patient contact our office.     Sincerely,                     Nicole Snow MD

## 2022-09-08 NOTE — PROGRESS NOTES
Manuela Grant presents today for No chief complaint on file. [employer] preferred language for health care discussion is english/other. Is someone accompanying this pt? no    Is the patient using any DME equipment during [address]? no    Depression Screening:  3 most recent PHQ Screens 9/9/2021   Little interest or pleasure in doing things Not at all   Feeling down, depressed, irritable, or hopeless Not at all   Total Score PHQ 2 0       Learning Assessment:  Learning Assessment 2/8/2018   PRIMARY LEARNER Patient   HIGHEST LEVEL OF EDUCATION - PRIMARY LEARNER  -   BARRIERS PRIMARY LEARNER -   PRIMARY LANGUAGE ENGLISH   LEARNER PREFERENCE PRIMARY DEMONSTRATION   ANSWERED BY Patient   RELATIONSHIP SELF       Abuse Screening:  Abuse Screening Questionnaire 5/28/2020   Do you ever feel afraid of your partner? N   Are you in a relationship with someone who physically or mentally threatens you? N   Is it safe for you to go home? Y       Fall Risk  Fall Risk Assessment, last 12 mths 5/28/2020   Able to walk? Yes   Fall in past 12 months? No       Pt currently taking Anticoagulant therapy? no    Coordination of Care:  1. Have you been to the ER, urgent care clinic since your last visit? Hospitalized since your last visit? no    2. Have you seen or consulted any other health care providers outside of the [address] [place] [address] since your last visit? Include any pap smears or colon screening.  no

## 2022-09-08 NOTE — PROGRESS NOTES
History of Present Illness:  39 YOF here for follow up. Overall she is doing well and continues to be active. No new chest pain, dyspnea, PND, orthopnea or edema. Some generalized fatigue at times and insomnia, perhaps when she has an extra cup of coffee. Impression:  Medtronic single chamber AICD with normal function, initially placed 2009, change-out 2016. History of idiopathic ventricular fibrillation, resolved. Cardiac arrest 2009, as well as 2006. History of transient nonischemic cardiomyopathy with EF in the 30s 2009, subsequently improved. History of hypokalemia and hypomagnesemia, on supplements, refilling today. Plan:  AICD is functioning normally. She had some events which are actually SVT or physiologic as the morphology is baseline. Her discriminators were double checked. All questions answered and if her symptoms worsen I will plan echocardiogram, for now we will monitor. Will continue with regular device checks and I will see in the office in a year. Past Medical History:   Diagnosis Date    Cardiac arrest ([facility] [address] 01/01/2006 and 2009    with subsequent Medtronic single-chamber AICD    Cardiac cath 01/01/2006    Cardiac echocardiogram 09/08/2009    EF 60%. No valvular pathology. Cardiomyopathy     Transient, with EF of 60% since September 8, 2009. Hypokalemia     Hypomagnesemia     Torsades de pointes (HCC)     recurrent       Current Outpatient Medications   Medication Sig Dispense Refill    potassium chloride (Klor-Con M10) 10 mEq tablet Take 1 Tablet by mouth daily. 90 Tablet 3    norgestimate-ethinyl estradiol (ORTHO TRI-CYCLEN, TRI-SPRINTEC) 0.18/0.215/0.25 mg-35 mcg (28) tab Take 1 Tab by mouth daily. 3 Package 4    magnesium 250 mg Tab Take 1 Tab by mouth daily. 30 Tab 6       Social History   reports that she has never smoked. She has never used smokeless tobacco.   reports current alcohol use. Family History  family history is not on file.     Review of Systems  Except as stated above include:  Constitutional: Negative for fever, chills and malaise/fatigue. HEENT: No congestion or recent URI. Gastrointestinal: No nausea, vomiting, abdominal pain, bloody stools. Pulmonary:  Negative except as stated above. Cardiac:  Negative except as stated above. Musculoskeletal: Negative except as stated above. Neurological:  No localized symptoms. Skin:  Negative except as stated above. Psych:  Negative except as stated above. Endocrine:  Negative except as stated above. PHYSICAL EXAM  BP Readings from Last 3 Encounters:   09/08/22 128/80   05/28/20 120/78   04/04/19 128/84     Pulse Readings from Last 3 Encounters:   09/08/22 71   05/28/20 91   04/04/19 75     Wt Readings from Last 3 Encounters:   09/08/22 69.4 kg (153 lb)   05/28/20 74.4 kg (164 lb)   04/04/19 78.9 kg (174 lb)     General:   Well developed, well groomed. Head/Neck:   No obvious jugular venous distention     No obvious carotid pulsations. No evidence of xanthelasma. Lungs:   No respiratory distress. Clear bilaterally. Heart:  Regular rate and rhythm. Normal S1/S2. Palpation grossly normal.    No significant murmurs, rubs or gallops. Abdomen:   Non-acute abdomen. No obvious pulsations. Extremities:   Intact peripheral pulses. No significant edema. Neurological:   Alert and oriented to person, place, time. No focal neurological deficit visually. Skin:   No obvious rash    Blood Pressure Metric:  Monitor recommended and adjustments stated if needed.

## 2022-09-13 NOTE — PROGRESS NOTES
I have personally seen and evaluated the device findings. Interrogation reviewed and I agree with assessment.     Sade Vail

## 2022-09-21 ENCOUNTER — TELEPHONE (OUTPATIENT)
Dept: CARDIOLOGY CLINIC | Age: 42
End: 2022-09-21

## 2022-10-26 RX ORDER — POTASSIUM CHLORIDE 750 MG/1
10 TABLET, EXTENDED RELEASE ORAL DAILY
Qty: 90 TABLET | Refills: 3 | Status: SHIPPED | OUTPATIENT
Start: 2022-10-26

## 2022-12-13 ENCOUNTER — PATIENT MESSAGE (OUTPATIENT)
Dept: CARDIOLOGY CLINIC | Age: 42
End: 2022-12-13

## 2022-12-14 ENCOUNTER — OFFICE VISIT (OUTPATIENT)
Dept: CARDIOLOGY CLINIC | Age: 42
End: 2022-12-14
Payer: COMMERCIAL

## 2022-12-14 DIAGNOSIS — I49.01 VF (VENTRICULAR FIBRILLATION) (HCC): Primary | ICD-10-CM

## 2022-12-14 DIAGNOSIS — Z95.810 AICD (AUTOMATIC CARDIOVERTER/DEFIBRILLATOR) PRESENT: ICD-10-CM

## 2022-12-14 PROCEDURE — 93282 PRGRMG EVAL IMPLANTABLE DFB: CPT | Performed by: INTERNAL MEDICINE

## 2023-01-10 NOTE — PROGRESS NOTES
I have personally seen and evaluated the device findings. Interrogation reviewed and I agree with assessment.     Socorro Wills

## 2024-03-07 ENCOUNTER — NURSE ONLY (OUTPATIENT)
Age: 44
End: 2024-03-07
Payer: COMMERCIAL

## 2024-03-07 ENCOUNTER — OFFICE VISIT (OUTPATIENT)
Age: 44
End: 2024-03-07
Payer: COMMERCIAL

## 2024-03-07 VITALS
OXYGEN SATURATION: 99 % | SYSTOLIC BLOOD PRESSURE: 110 MMHG | HEIGHT: 65 IN | DIASTOLIC BLOOD PRESSURE: 60 MMHG | BODY MASS INDEX: 27.32 KG/M2 | WEIGHT: 164 LBS | HEART RATE: 87 BPM

## 2024-03-07 DIAGNOSIS — Z95.810 AICD (AUTOMATIC CARDIOVERTER/DEFIBRILLATOR) PRESENT: Primary | ICD-10-CM

## 2024-03-07 DIAGNOSIS — I49.01 VENTRICULAR FIBRILLATION (HCC): ICD-10-CM

## 2024-03-07 PROCEDURE — G8484 FLU IMMUNIZE NO ADMIN: HCPCS | Performed by: INTERNAL MEDICINE

## 2024-03-07 PROCEDURE — G8428 CUR MEDS NOT DOCUMENT: HCPCS | Performed by: INTERNAL MEDICINE

## 2024-03-07 PROCEDURE — G8419 CALC BMI OUT NRM PARAM NOF/U: HCPCS | Performed by: INTERNAL MEDICINE

## 2024-03-07 PROCEDURE — 99214 OFFICE O/P EST MOD 30 MIN: CPT | Performed by: INTERNAL MEDICINE

## 2024-03-07 PROCEDURE — 1036F TOBACCO NON-USER: CPT | Performed by: INTERNAL MEDICINE

## 2024-03-07 RX ORDER — APIXABAN 5 MG/1
5 TABLET, FILM COATED ORAL 2 TIMES DAILY
COMMUNITY
Start: 2024-02-12

## 2024-03-07 NOTE — PROGRESS NOTES
Wt Readings from Last 3 Encounters:   03/07/24 74.4 kg (164 lb)   09/08/22 69.4 kg (153 lb)     Past Medical History:   Diagnosis Date    Cardiac arrest (HCC) 01/01/2006 and 2009    with subsequent Medtronic single-chamber AICD    Cardiomyopathy (HCC)     Transient, with EF of 60% since September 8, 2009.    Echocardiogram abnormal 09/08/2009    EF 60%.  No valvular pathology.    History of cardiac cath 01/01/2006    Hypokalemia     Hypomagnesemia     Torsades de pointes (HCC)     recurrent       Current Outpatient Medications   Medication Sig Dispense Refill    ELIQUIS 5 MG TABS tablet Take 1 tablet by mouth 2 times daily      Norgestim-Eth Estrad Triphasic 0.18/0.215/0.25 MG-35 MCG TABS Take 1 tablet by mouth daily      potassium chloride (KLOR-CON M) 10 MEQ extended release tablet Take 1 tablet by mouth daily       No current facility-administered medications for this visit.       Social History   reports that she has never smoked. She has never used smokeless tobacco.   reports current alcohol use.    Family History  family history is not on file.    Review of Systems  Except as stated above include:  Constitutional: Negative for fever, chills and malaise/fatigue.   HEENT: No congestion or recent URI.  Gastrointestinal: No nausea, vomiting, abdominal pain, bloody stools.  Pulmonary:  Negative except as stated above.  Cardiac:  Negative except as stated above.  Musculoskeletal: Negative except as stated above.  Neurological:  No localized symptoms.  Endocrine:  Negative except as stated above.    PHYSICAL EXAM  BP Readings from Last 3 Encounters:   03/07/24 110/60   09/08/22 128/80     Pulse Readings from Last 3 Encounters:   03/07/24 87   09/08/22 71     General:   Well developed, well groomed.    Head/Neck:   No obvious jugular venous distention     No obvious carotid pulsations.      No evidence of xanthelasma.  Lungs:   No respiratory distress.      Clear bilaterally.  Heart:  Regular rate and rhythm.

## 2024-03-07 NOTE — PROGRESS NOTES
History of Present Illness:  43 year-old female here for followup.  The last time I saw her was 09/2022.  She was hospitalized in December with DVT and PE in the setting of oral contraceptive use.  It was discontinued.  She is on Eliquis and following up with hematology for at least six to nine months.  From a cardiac standpoint, she is doing well without any chest pain, dyspnea, PND, orthopnea or edema at this time, no bleeding issues.      Impression:   Recent PE and DVT in the setting of oral contraceptive use, now on Eliquis for six to nine months.    Medtronic single chamber AICD with normal function, initially placed 2009, changed out 2016.   History of idiopathic ventricular fibrillation, resolved.    Cardiac arrest 2009, again in 2006.   Transient cardiomyopathy, nonischemic with EF of 30% in 2009, subsequently improved.    Previous hypokalemia and hypomagnesemia on long term supplements.      Plan:  She has a history of AICD for secondary prevention with Torsades.  She is on long term potassium replacement, as well as magnesium.  She had recent PE and DVT due to oral contraceptive use and is no longer taking.  She is on Eliquis.  She is planning to move to [place] in the next six months and I am going to make a referral to Dr. Marie for completeness.  If she does end up staying locally, I will continue to follow.          Wt Readings from Last 3 Encounters:   03/07/24 74.4 kg (164 lb)   09/08/22 69.4 kg (153 lb)     Past Medical History:   Diagnosis Date    Cardiac arrest (HCC) 01/01/2006 and 2009    with subsequent Medtronic single-chamber AICD    Cardiomyopathy (HCC)     Transient, with EF of 60% since September 8, 2009.    Echocardiogram abnormal 09/08/2009    EF 60%.  No valvular pathology.    History of cardiac cath 01/01/2006    Hypokalemia     Hypomagnesemia     Torsades de pointes (HCC)     recurrent       Current Outpatient Medications   Medication Sig Dispense Refill    ELIQUIS 5 MG TABS tablet

## 2024-03-15 PROCEDURE — 93282 PRGRMG EVAL IMPLANTABLE DFB: CPT | Performed by: INTERNAL MEDICINE

## 2024-03-25 RX ORDER — POTASSIUM CHLORIDE 750 MG/1
10 TABLET, EXTENDED RELEASE ORAL DAILY
Qty: 90 TABLET | Refills: 3 | Status: SHIPPED | OUTPATIENT
Start: 2024-03-25

## 2024-07-23 ENCOUNTER — TELEPHONE (OUTPATIENT)
Age: 44
End: 2024-07-23